# Patient Record
Sex: FEMALE | Race: WHITE | URBAN - METROPOLITAN AREA
[De-identification: names, ages, dates, MRNs, and addresses within clinical notes are randomized per-mention and may not be internally consistent; named-entity substitution may affect disease eponyms.]

---

## 2020-10-06 ENCOUNTER — OFFICE VISIT (OUTPATIENT)
Dept: URBAN - METROPOLITAN AREA CLINIC 68 | Facility: CLINIC | Age: 75
End: 2020-10-06
Payer: MEDICARE

## 2020-10-06 PROCEDURE — 92014 COMPRE OPH EXAM EST PT 1/>: CPT | Performed by: OPHTHALMOLOGY

## 2020-10-06 PROCEDURE — 67028 INJECTION EYE DRUG: CPT | Performed by: OPHTHALMOLOGY

## 2020-10-06 ASSESSMENT — INTRAOCULAR PRESSURE
OS: 15
OD: 15

## 2020-10-06 NOTE — IMPRESSION/PLAN
Impression: CRVO, OS. Status: Chronic.
s/p Ozurdex x 8 last 12/02/14  
s/p Lucentis x 57 last 04/03/18
s/p Avastin x 39 last 08/11/2020 (consented 02/25/2020) Plan: The patient responded well to Ozurdex in the past. Exam and OCT reveal mild extrafoveal CME with macular thickening OS (258 microns, from 572 microns). Recommend Avastin. R/B/A discussed with patient. The risks of Avastin were discussed, including off-label use and compounding pharmacy risks, and the patient elects to proceed with Avastin. After 2% Subconjunctival anesthesia, 1.25mg of Avastin was injected in the eye. Cold compress and Tylenol suggested for pain if needed. Thanks, Lucy Return in 4-6 wks for Re-evaluation of CME, OCT OU (No Betadine), possible Avastin

## 2020-11-03 ENCOUNTER — OFFICE VISIT (OUTPATIENT)
Dept: URBAN - METROPOLITAN AREA CLINIC 68 | Facility: CLINIC | Age: 75
End: 2020-11-03
Payer: MEDICARE

## 2020-11-03 DIAGNOSIS — H04.123 DRY EYE SYNDROME OF BILATERAL LACRIMAL GLANDS: ICD-10-CM

## 2020-11-03 PROCEDURE — 92014 COMPRE OPH EXAM EST PT 1/>: CPT | Performed by: OPHTHALMOLOGY

## 2020-11-03 PROCEDURE — 92134 CPTRZ OPH DX IMG PST SGM RTA: CPT | Performed by: OPHTHALMOLOGY

## 2020-11-03 PROCEDURE — 67028 INJECTION EYE DRUG: CPT | Performed by: OPHTHALMOLOGY

## 2020-11-03 ASSESSMENT — INTRAOCULAR PRESSURE
OS: 9
OD: 8

## 2020-11-03 NOTE — IMPRESSION/PLAN
Impression: CRVO, OS. Status: Chronic.
s/p Ozurdex x 8 last 12/02/14  
s/p Lucentis x 57 last 04/03/18
s/p Avastin x 40 last 10/6/2020 (consented 02/25/2020) Plan: The patient responded well to Ozurdex in the past. Exam and OCT reveal mild extrafoveal CME with macular thickening OS (191 microns, from 572 microns). Recommend Avastin. R/B/A discussed with patient. The risks of Avastin were discussed, including off-label use and compounding pharmacy risks, and the patient elects to proceed with Avastin. After 2% Subconjunctival anesthesia, 1.25mg of Avastin was injected in the eye. Cold compress and Tylenol suggested for pain if needed. Thanks, Lucy Return in 4-6 wks for Re-evaluation of CME, OCT OU (No Betadine), possible Avastin

## 2020-12-01 ENCOUNTER — OFFICE VISIT (OUTPATIENT)
Dept: URBAN - METROPOLITAN AREA CLINIC 68 | Facility: CLINIC | Age: 75
End: 2020-12-01
Payer: MEDICARE

## 2020-12-01 PROCEDURE — 92134 CPTRZ OPH DX IMG PST SGM RTA: CPT | Performed by: OPHTHALMOLOGY

## 2020-12-01 PROCEDURE — 67028 INJECTION EYE DRUG: CPT | Performed by: OPHTHALMOLOGY

## 2020-12-01 PROCEDURE — 92014 COMPRE OPH EXAM EST PT 1/>: CPT | Performed by: OPHTHALMOLOGY

## 2020-12-01 ASSESSMENT — INTRAOCULAR PRESSURE
OS: 10
OD: 9

## 2020-12-01 NOTE — IMPRESSION/PLAN
Impression: CRVO, OS. Status: Chronic.
s/p Ozurdex x 8 last 12/02/14  
s/p Lucentis x 57 last 04/03/18
s/p Avastin x 41 last 11/3/2020 (consented 02/25/2020) Plan: The patient responded well to Ozurdex in the past. Exam and OCT reveal mild extrafoveal CME with macular thickening OS (186 microns, from 572 microns). Recommend Avastin. R/B/A discussed with patient. The risks of Avastin were discussed, including off-label use and compounding pharmacy risks, and the patient elects to proceed with Avastin. After 2% Subconjunctival anesthesia, 1.25mg of Avastin was injected in the eye. Cold compress and Tylenol suggested for pain if needed. Thanks, Lucy Return in 4-6 wks for Re-evaluation of CME, OCT OU (No Betadine), possible Avastin

## 2021-05-04 ENCOUNTER — OFFICE VISIT (OUTPATIENT)
Dept: URBAN - METROPOLITAN AREA CLINIC 68 | Facility: CLINIC | Age: 76
End: 2021-05-04
Payer: MEDICARE

## 2021-05-04 DIAGNOSIS — H35.81 RETINAL EDEMA: ICD-10-CM

## 2021-05-04 PROCEDURE — 67028 INJECTION EYE DRUG: CPT | Performed by: OPHTHALMOLOGY

## 2021-05-04 PROCEDURE — 92014 COMPRE OPH EXAM EST PT 1/>: CPT | Performed by: OPHTHALMOLOGY

## 2021-05-04 PROCEDURE — 92134 CPTRZ OPH DX IMG PST SGM RTA: CPT | Performed by: OPHTHALMOLOGY

## 2021-05-04 ASSESSMENT — INTRAOCULAR PRESSURE
OS: 9
OD: 10

## 2021-05-04 NOTE — IMPRESSION/PLAN
Impression: CRVO, OS. Status: Chronic.
s/p Ozurdex x 8 last 12/02/14  
s/p Lucentis x 57 last 04/03/18
s/p Avastin x 42  last 12/01/2020  Plan: The patient was lost to follow up. The patient responded well to Ozurdex in the past. Exam and OCT reveal mild extrafoveal CME with macular thickening OS (401 microns, from 186 microns, from 572 microns). Recommend Avastin. R/B/A discussed with patient. The risks of Avastin were discussed, including off-label use and compounding pharmacy risks, and the patient elects to proceed with Avastin. After 2% Subconjunctival anesthesia, 1.25mg of Avastin was injected in the eye. Cold compress and Tylenol suggested for pain if needed. Thanks, Lucy Return in 4-6 wks for Re-evaluation of CME, OCT OU (No Betadine), possible Avastin

## 2021-08-24 ENCOUNTER — OFFICE VISIT (OUTPATIENT)
Dept: URBAN - METROPOLITAN AREA CLINIC 68 | Facility: CLINIC | Age: 76
End: 2021-08-24
Payer: MEDICARE

## 2021-08-24 DIAGNOSIS — H43.813 VITREOUS DEGENERATION, BILATERAL: ICD-10-CM

## 2021-08-24 PROCEDURE — 92134 CPTRZ OPH DX IMG PST SGM RTA: CPT | Performed by: OPHTHALMOLOGY

## 2021-08-24 PROCEDURE — 92014 COMPRE OPH EXAM EST PT 1/>: CPT | Performed by: OPHTHALMOLOGY

## 2021-08-24 PROCEDURE — 67028 INJECTION EYE DRUG: CPT | Performed by: OPHTHALMOLOGY

## 2021-08-24 ASSESSMENT — INTRAOCULAR PRESSURE
OD: 7
OS: 10

## 2021-08-24 NOTE — IMPRESSION/PLAN
Impression: CRVO, OS. Status: Chronic.
s/p Ozurdex x 8 last 12/02/14  
s/p Lucentis x 57 last 04/03/18
s/p Avastin x 43  last 05/04/2021  Plan: The patient responded well to Ozurdex in the past. Exam and OCT reveal mild extrafoveal CME with macular thickening OS (529 microns, from 401 microns, from 186 microns, from 572 microns). Recommend Avastin and Ozurdex. R/B/A discussed with patient. The risks of Avastin were discussed, including off-label use and compounding pharmacy risks, and the patient elects to proceed with Avastin. After 2% Subconjunctival anesthesia, 1.25mg of Avastin was injected in the eye. Cold compress and Tylenol suggested for pain if needed. Thanks, Romon Return in 2 weeks for Ozurdex OS, and in 4-6 wks for Re-evaluation of CME, OCT OU (No Betadine), possible Avastin

## 2021-09-07 ENCOUNTER — PROCEDURE (OUTPATIENT)
Dept: URBAN - METROPOLITAN AREA CLINIC 68 | Facility: CLINIC | Age: 76
End: 2021-09-07
Payer: MEDICARE

## 2021-09-07 PROCEDURE — 67028 INJECTION EYE DRUG: CPT | Performed by: OPHTHALMOLOGY

## 2021-09-07 ASSESSMENT — INTRAOCULAR PRESSURE
OD: 8
OS: 12

## 2021-09-21 ENCOUNTER — PROCEDURE (OUTPATIENT)
Dept: URBAN - METROPOLITAN AREA CLINIC 68 | Facility: CLINIC | Age: 76
End: 2021-09-21
Payer: MEDICARE

## 2021-09-21 DIAGNOSIS — H01.004 UNSPECIFIED BLEPHARITIS LEFT UPPER EYELID: ICD-10-CM

## 2021-09-21 DIAGNOSIS — Z96.1 PRESENCE OF INTRAOCULAR LENS: ICD-10-CM

## 2021-09-21 DIAGNOSIS — H34.8120 CENTRAL RETINAL VEIN OCCLUSION, LEFT EYE, WITH MACULAR EDEMA: Primary | ICD-10-CM

## 2021-09-21 PROCEDURE — 92014 COMPRE OPH EXAM EST PT 1/>: CPT | Performed by: OPHTHALMOLOGY

## 2021-09-21 PROCEDURE — 92134 CPTRZ OPH DX IMG PST SGM RTA: CPT | Performed by: OPHTHALMOLOGY

## 2021-09-21 PROCEDURE — 67028 INJECTION EYE DRUG: CPT | Performed by: OPHTHALMOLOGY

## 2021-09-21 ASSESSMENT — INTRAOCULAR PRESSURE
OS: 12
OD: 11

## 2021-09-21 NOTE — IMPRESSION/PLAN
Impression: CRVO, OS. Status: Chronic.
s/p Ozurdex x 9  last 09/07/2021
s/p Lucentis x 57 last 04/03/18
s/p Avastin x 43  last 05/04/2021 Plan: The patient responded well to Ozurdex in the past. Exam and OCT reveal mild extrafoveal CME with macular thickening OS (199 microns, from 529 microns, from 401 microns, from 186 microns, from 572 microns). Recommend Avastin. R/B/A discussed with patient. The risks of Avastin were discussed, including off-label use and compounding pharmacy risks, and the patient elects to proceed with Avastin. After 2% Subconjunctival anesthesia, 1.25mg of Avastin was injected in the eye. Cold compress and Tylenol suggested for pain if needed. Lucy Shaver Return  in 4-6 wks for Re-evaluation of CME, OCT OU (No Betadine), possible Avastin